# Patient Record
Sex: MALE | URBAN - METROPOLITAN AREA
[De-identification: names, ages, dates, MRNs, and addresses within clinical notes are randomized per-mention and may not be internally consistent; named-entity substitution may affect disease eponyms.]

---

## 2020-03-03 ENCOUNTER — OFFICE VISIT (OUTPATIENT)
Dept: URBAN - METROPOLITAN AREA CLINIC 76 | Facility: CLINIC | Age: 70
End: 2020-03-03
Payer: MEDICARE

## 2020-03-03 DIAGNOSIS — H43.812 VITREOUS DEGENERATION, LEFT EYE: ICD-10-CM

## 2020-03-03 DIAGNOSIS — H25.13 AGE-RELATED NUCLEAR CATARACT, BILATERAL: Primary | ICD-10-CM

## 2020-03-03 DIAGNOSIS — H52.4 PRESBYOPIA: ICD-10-CM

## 2020-03-03 PROCEDURE — 76519 ECHO EXAM OF EYE: CPT | Performed by: OPHTHALMOLOGY

## 2020-03-03 PROCEDURE — 99204 OFFICE O/P NEW MOD 45 MIN: CPT | Performed by: OPHTHALMOLOGY

## 2020-03-03 RX ORDER — DUREZOL 0.5 MG/ML
0.05 % EMULSION OPHTHALMIC
Qty: 5 | Refills: 1 | Status: INACTIVE
Start: 2020-03-03 | End: 2020-04-10

## 2020-03-03 RX ORDER — OFLOXACIN 3 MG/ML
0.3 % SOLUTION/ DROPS OPHTHALMIC
Qty: 5 | Refills: 1 | Status: ACTIVE
Start: 2020-03-03

## 2020-03-03 ASSESSMENT — KERATOMETRY
OS: 42.75
OD: 41.88

## 2020-03-03 ASSESSMENT — VISUAL ACUITY
OS: 20/30
OD: 20/25

## 2020-03-03 ASSESSMENT — PACHYMETRY
OS: 3.16
OD: 24.32
OD: 3.09
OS: 24.24

## 2020-03-03 ASSESSMENT — INTRAOCULAR PRESSURE
OD: 11
OS: 12

## 2020-03-03 NOTE — IMPRESSION/PLAN
Impression: Age-related nuclear cataract, bilateral: H25.13. Bilateral.  Visually significant. ASCAN done today. Plan: Cataracts account for the patient's complaints. Discussed all risks, benefits, procedures and recovery. Patient understands changing glasses will not improve vision. Discussed added risk due to maturity of cataract OD, astigmatism OS. Patient desires to have surgery, recommend CE IOL OU, OD first.  Discussed iol options, recommend STANDARD IOL OD ([id number]), STANDARD/TORIC OS (pt elects TORIC OS). TARGET: DISTANCE.  RL2. Discussed astigmatism diagnosis with patient. Patient understands that standard lens does not correct for astigmatism and patient will need gls for distance and near after Cataract Surgery. Patient can consider toric IOL OS, advised additional testing will need to be done for final IOL recommendations. Patient understands. Discussed anisometropia after having first eye done. Pt aware. Will rely on Dr. Harshal Goldberg for Primary and [address] [facility].

## 2020-03-04 ENCOUNTER — Encounter (OUTPATIENT)
Dept: URBAN - METROPOLITAN AREA CLINIC 76 | Facility: CLINIC | Age: 70
End: 2020-03-04
Payer: MEDICARE

## 2020-03-04 PROCEDURE — 92025 CPTRIZED CORNEAL TOPOGRAPHY: CPT | Performed by: OPHTHALMOLOGY

## 2020-03-16 ENCOUNTER — SURGERY (OUTPATIENT)
Dept: URBAN - METROPOLITAN AREA SURGERY 47 | Facility: SURGERY | Age: 70
End: 2020-03-16
Payer: MEDICARE

## 2020-05-11 ENCOUNTER — SURGERY (OUTPATIENT)
Dept: URBAN - METROPOLITAN AREA SURGERY 47 | Facility: SURGERY | Age: 70
End: 2020-05-11
Payer: MEDICARE

## 2020-05-12 ENCOUNTER — POST-OPERATIVE VISIT (OUTPATIENT)
Dept: URBAN - METROPOLITAN AREA CLINIC 76 | Facility: CLINIC | Age: 70
End: 2020-05-12
Payer: MEDICARE

## 2020-05-12 DIAGNOSIS — Z96.1 PRESENCE OF INTRAOCULAR LENS: Primary | ICD-10-CM

## 2020-05-12 ASSESSMENT — INTRAOCULAR PRESSURE
OD: 16
OS: 32

## 2020-05-12 NOTE — IMPRESSION/PLAN
Impression: S/P CE/Toric IOL  +20.0 OS - 57 Days. Presence of intraocular lens  Z96. 1. looks good IOP OS higher today, recommend starting Timolol BID OS Plan: Continue to monitor.  Continue Pred Forte and Ocuflox QID OS and Timolol BID OS